# Patient Record
Sex: FEMALE | Race: BLACK OR AFRICAN AMERICAN | NOT HISPANIC OR LATINO
[De-identification: names, ages, dates, MRNs, and addresses within clinical notes are randomized per-mention and may not be internally consistent; named-entity substitution may affect disease eponyms.]

---

## 2017-02-01 ENCOUNTER — RESULT REVIEW (OUTPATIENT)
Age: 52
End: 2017-02-01

## 2017-04-24 ENCOUNTER — APPOINTMENT (OUTPATIENT)
Dept: MAMMOGRAPHY | Facility: IMAGING CENTER | Age: 52
End: 2017-04-24

## 2017-04-24 ENCOUNTER — OUTPATIENT (OUTPATIENT)
Dept: OUTPATIENT SERVICES | Facility: HOSPITAL | Age: 52
LOS: 1 days | End: 2017-04-24
Payer: COMMERCIAL

## 2017-04-24 DIAGNOSIS — Z98.89 OTHER SPECIFIED POSTPROCEDURAL STATES: Chronic | ICD-10-CM

## 2017-04-24 DIAGNOSIS — Z00.8 ENCOUNTER FOR OTHER GENERAL EXAMINATION: ICD-10-CM

## 2017-04-24 PROCEDURE — 77063 BREAST TOMOSYNTHESIS BI: CPT

## 2017-04-24 PROCEDURE — 77067 SCR MAMMO BI INCL CAD: CPT

## 2017-06-27 ENCOUNTER — EMERGENCY (EMERGENCY)
Facility: HOSPITAL | Age: 52
LOS: 1 days | Discharge: HOME | End: 2017-06-27
Admitting: EMERGENCY MEDICINE
Payer: COMMERCIAL

## 2017-06-27 VITALS
OXYGEN SATURATION: 100 % | SYSTOLIC BLOOD PRESSURE: 131 MMHG | TEMPERATURE: 99 F | HEART RATE: 78 BPM | DIASTOLIC BLOOD PRESSURE: 84 MMHG | RESPIRATION RATE: 19 BRPM

## 2017-06-27 DIAGNOSIS — Z98.89 OTHER SPECIFIED POSTPROCEDURAL STATES: Chronic | ICD-10-CM

## 2017-06-27 PROCEDURE — 99283 EMERGENCY DEPT VISIT LOW MDM: CPT | Mod: 25

## 2017-06-27 PROCEDURE — 99283 EMERGENCY DEPT VISIT LOW MDM: CPT

## 2017-06-27 PROCEDURE — 73630 X-RAY EXAM OF FOOT: CPT | Mod: 26,RT

## 2017-06-27 PROCEDURE — 73630 X-RAY EXAM OF FOOT: CPT

## 2017-06-27 RX ORDER — ACETAMINOPHEN 500 MG
650 TABLET ORAL ONCE
Qty: 0 | Refills: 0 | Status: COMPLETED | OUTPATIENT
Start: 2017-06-27 | End: 2017-06-27

## 2017-06-27 RX ADMIN — Medication 650 MILLIGRAM(S): at 16:03

## 2017-06-27 NOTE — ED PROVIDER NOTE - MUSCULOSKELETAL, MLM
range of motion is not limited, point ttp over dorsal aspect of midfoot of RLE.  No ttp at medial/lateral malleolus, no navicular ttp/ttp at base of 5th metatarsal.  No pain with inversion/eversion, dorsi/plantar flexion.  No pain with axial loading of b/l LE.  Able to bear some weight on RLE.  Bears majority of weight with LLE during stance phase of gait

## 2017-06-27 NOTE — ED PROVIDER NOTE - OBJECTIVE STATEMENT
52F presents with right foot pain s/p injury at work.  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  Was able to continue to work but has worse pain with weight bearing and ambulating.  No fall, no head trauma.  Denies swelling.  No analgesia on board. 52F presents with right foot pain s/p injury at work.  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  Was able to continue to work but has worse pain with weight bearing and ambulating.  No fall, no head trauma.  Denies swelling.  No analgesia on board.  No antiplatelets/anticoagulants

## 2017-06-27 NOTE — ED PROVIDER NOTE - PLAN OF CARE
You have an injury that requires local therapy. Please rest the injury, ice it (with separation from your skin with a bag or cloth) for 20 minute periods at least 4 times a day, keep pressure on the wound or wear a compressive dressing, and elevate the injury above the level of your heart at least 4 times a day, while icing, and while sleeping; this is known as RICE therapy.  Use Tylenol 650mg and/or Motrin 600mg every 6 hours as needed for pain.  Follow-up with your primary doctor.  Return for any new/worsening symptoms or concerns.

## 2017-06-27 NOTE — ED PROCEDURE NOTE - ATTENDING CONTRIBUTION TO CARE
I performed a history and physical exam of the patient and discussed their management with the resident. I reviewed the resident's note and agree with the documented findings and plan of care.

## 2017-06-27 NOTE — ED ADULT NURSE NOTE - OBJECTIVE STATEMENT
No 53 y/o F, reported to ED from work. A&ox3, c/o foot pain. 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 Pt reports 7/10 pain. Pt had PMS in BL feet. Pt denies taking medications for her pain prior to arrival. Pt denies falling or LOC after the incident. Will continue to monitor pt.

## 2017-06-27 NOTE — ED PROVIDER NOTE - CARE PLAN
Principal Discharge DX:	Foot pain, right  Instructions for follow-up, activity and diet:	You have an injury that requires local therapy. Please rest the injury, ice it (with separation from your skin with a bag or cloth) for 20 minute periods at least 4 times a day, keep pressure on the wound or wear a compressive dressing, and elevate the injury above the level of your heart at least 4 times a day, while icing, and while sleeping; this is known as RICE therapy.  Use Tylenol 650mg and/or Motrin 600mg every 6 hours as needed for pain.  Follow-up with your primary doctor.  Return for any new/worsening symptoms or concerns.

## 2017-06-27 NOTE — ED PROVIDER NOTE - PROGRESS NOTE DETAILS
ED reading of xray NAD.  No be discharged with ace wrap.  RICE, analgesia and return precautions.     Zoie Crawford MD

## 2017-06-27 NOTE — ED PROVIDER NOTE - ATTENDING CONTRIBUTION TO CARE
I performed a history and physical exam of the patient and discussed their management with the resident. I reviewed the resident's note and agree with the documented findings and plan of care. My medical decision making and observations are found above.  No bleedig, +pain with motion.

## 2017-06-27 NOTE — ED PROCEDURE NOTE - NS ED PERI VASCULAR NEG
fingers/toes warm to touch/no swelling/no paresthesia/no cyanosis of extremity/capillary refill time < 2 seconds

## 2018-02-25 ENCOUNTER — RESULT REVIEW (OUTPATIENT)
Age: 53
End: 2018-02-25

## 2018-06-12 ENCOUNTER — OUTPATIENT (OUTPATIENT)
Dept: OUTPATIENT SERVICES | Facility: HOSPITAL | Age: 53
LOS: 1 days | End: 2018-06-12
Payer: COMMERCIAL

## 2018-06-12 ENCOUNTER — APPOINTMENT (OUTPATIENT)
Dept: MAMMOGRAPHY | Facility: IMAGING CENTER | Age: 53
End: 2018-06-12
Payer: COMMERCIAL

## 2018-06-12 DIAGNOSIS — Z98.89 OTHER SPECIFIED POSTPROCEDURAL STATES: Chronic | ICD-10-CM

## 2018-06-12 DIAGNOSIS — Z00.00 ENCOUNTER FOR GENERAL ADULT MEDICAL EXAMINATION WITHOUT ABNORMAL FINDINGS: ICD-10-CM

## 2018-06-12 PROCEDURE — 77067 SCR MAMMO BI INCL CAD: CPT | Mod: 26

## 2018-06-12 PROCEDURE — 77067 SCR MAMMO BI INCL CAD: CPT

## 2018-06-12 PROCEDURE — 77063 BREAST TOMOSYNTHESIS BI: CPT

## 2018-06-12 PROCEDURE — 77063 BREAST TOMOSYNTHESIS BI: CPT | Mod: 26

## 2019-06-19 ENCOUNTER — RESULT REVIEW (OUTPATIENT)
Age: 54
End: 2019-06-19

## 2019-07-11 ENCOUNTER — APPOINTMENT (OUTPATIENT)
Dept: MAMMOGRAPHY | Facility: IMAGING CENTER | Age: 54
End: 2019-07-11
Payer: COMMERCIAL

## 2019-07-11 ENCOUNTER — OUTPATIENT (OUTPATIENT)
Dept: OUTPATIENT SERVICES | Facility: HOSPITAL | Age: 54
LOS: 1 days | End: 2019-07-11
Payer: COMMERCIAL

## 2019-07-11 DIAGNOSIS — Z00.8 ENCOUNTER FOR OTHER GENERAL EXAMINATION: ICD-10-CM

## 2019-07-11 DIAGNOSIS — Z98.89 OTHER SPECIFIED POSTPROCEDURAL STATES: Chronic | ICD-10-CM

## 2019-07-11 PROCEDURE — 77067 SCR MAMMO BI INCL CAD: CPT | Mod: 26

## 2019-07-11 PROCEDURE — 77063 BREAST TOMOSYNTHESIS BI: CPT

## 2019-07-11 PROCEDURE — 77063 BREAST TOMOSYNTHESIS BI: CPT | Mod: 26

## 2019-07-11 PROCEDURE — 77067 SCR MAMMO BI INCL CAD: CPT

## 2020-03-20 ENCOUNTER — EMERGENCY (EMERGENCY)
Facility: HOSPITAL | Age: 55
LOS: 1 days | Discharge: HOME | End: 2020-03-20
Attending: EMERGENCY MEDICINE
Payer: COMMERCIAL

## 2020-03-20 VITALS
DIASTOLIC BLOOD PRESSURE: 75 MMHG | RESPIRATION RATE: 18 BRPM | OXYGEN SATURATION: 100 % | HEART RATE: 78 BPM | SYSTOLIC BLOOD PRESSURE: 125 MMHG | WEIGHT: 199.08 LBS | HEIGHT: 63 IN | TEMPERATURE: 100 F

## 2020-03-20 DIAGNOSIS — Z98.89 OTHER SPECIFIED POSTPROCEDURAL STATES: Chronic | ICD-10-CM

## 2020-03-20 PROCEDURE — 99284 EMERGENCY DEPT VISIT MOD MDM: CPT

## 2020-03-20 PROCEDURE — 99282 EMERGENCY DEPT VISIT SF MDM: CPT

## 2020-03-20 NOTE — ED PROVIDER NOTE - ATTENDING CONTRIBUTION TO CARE
54 y/o f with pmhx  presents for evaluation of fever. 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 Patient states her symptoms began yesterday when she had a cough and fever today. .8F fever measured this morning.  Does not have recent travel and no known COVID + exposures. Didn't take tylenol or advil prior to arrival today.  limited exam as patient was upset / angry and wanted to leave.

## 2020-03-20 NOTE — ED PROVIDER NOTE - DATE/TIME 2
20-Mar-2020 09:02 Simponi Pregnancy And Lactation Text: The risk during pregnancy and breastfeeding is uncertain with this medication.

## 2020-03-20 NOTE — ED PROVIDER NOTE - PROGRESS NOTE DETAILS
Merrill, PGY1: pt is [employer] Employee. Appropriate PPE was used, in room from 8:10-8:20am Merrill, PGY1: pt eloped from ED, called back to test her for Covid as she is an employee but pt didn't  her phone

## 2020-03-20 NOTE — ED ADULT NURSE REASSESSMENT NOTE - NS ED NURSE REASSESS COMMENT FT1
Pt seen asssessed and elevated by ED MD. As per MD to be discharged home.  Pt left at 0900 stating she had to go to the bathroom prior to being given discharge paperwork; patient did not return; md and team aware

## 2020-03-20 NOTE — ED PROVIDER NOTE - CLINICAL SUMMARY MEDICAL DECISION MAKING FREE TEXT BOX
55F no PMH, presenting from OR staff upstairs with measured temp 100.8F this morning, dry cough and headache since yesterday. Denies sob or chest pain. Vitals reassuring, pt 100.2 temp. Clear lungs on exam, sats 100% RA, no resp distress. Possible Covid vs other viral syndrome. RVP and send home. 55F no PMH, presenting from OR staff upstairs with measured temp 100.8F this morning, dry cough and headache since yesterday. Denies sob or chest pain. Vitals reassuring, pt 100.2 temp. Clear lungs on exam, sats 100% RA, no resp distress. Possible Covid vs other viral syndrome. RVP and send home.  ATTG: : fever with recent cough, concern for infection. explained to patient that she may have COVID-19 vs other viral infection. given her s/s / vitals does not need admission at this time. provided her with return precautions and patient is aware of outpt testing sites.

## 2020-03-20 NOTE — ED PROVIDER NOTE - NSFOLLOWUPINSTRUCTIONS_ED_ALL_ED_FT
YOU WERE SEEN IN THE ED FOR A LIKELY VIRAL ILLNESS. WE CANNOT PERFORM DEFINITIVE TESTING AT THIS TIME FOR THE NOVEL CORONAVIRUS. PLEASE READ THE INFORMATION PACKET PROVIDED TO YOU CAREFULLY.     YOU SHOULD SELF-QUARANTINE FOR 14 DAYS TO AVOID POTENTIAL SPREAD OF THE CORONAVIRUS.     YOU WERE TESTED FOR MORE COMMON VIRUSES (THE FLU AND COMMON COLD VIRUSES). YOU WILL BE CONTACTED IF YOU TEST POSITIVE FOR ANY OTHER VIRUSES.    YOU WILL BE CONTACTED BY [facility] OR YOUR LOCAL HEALTH DEPARTMENT AFTER YOUR DISCHARGE TO HELP GUIDE YOU IN YOUR QUARANTINE AND ADVISE YOU FURTHER.    Return to the ED for difficulty breathing.    You may over the counter acetaminophen (Tylenol) 650mg every 6 hours as needed for fever or pain. There is some concern in the medical community about using ibuprofen (Advil, Motrin) and other NSAIDs in people with COVID infections and until there is more research on this subject it may be best to avoid NSAIDs like ibuprofen at the moment unless you have an allergy to acetaminophen (Tylenol).  Do NOT exceed 3500mg acetaminophen in 24 hours.  Please do not take these medications if you do not have pain or fever or if you have any history of liver disease.    -------------    What is a coronavirus?  Coronaviruses are a large family of viruses that cause illnesses ranging from the common cold to more severe diseases such as Middle East Respiratory Syndrome (MERS) and Severe Acute Respiratory Syndrome (SARS).    What is Novel Coronavirus (COVID-19)?  The Centers for Disease Control and Prevention (CDC) is closely monitoring the outbreak caused by COVID-19. For the latest information about COVID-19, visit the CDC website at CDC.gov/Coronavirus    How are coronaviruses spread?  Coronaviruses can be transmitted from person to person, usually after close contact with an infected  person (for example, in a household, workplace, or healthcare setting), via droplets that become airborne after a cough or sneeze. These droplets can then infect a nearby person. Transmission can also occur by touching recently contaminated surfaces.    Is there a treatment for a COVID-19?  There is no specific treatment for disease caused by COVID-19. However, many of the symptoms can be treated based on the patient’s clinical condition. Supportive care for infected persons can be highly effective.    What are the symptoms of coronavirus infection?  It depends on the virus, but common signs include fever and/or respiratory symptoms such as cough and shortness of breath. In more severe cases, infection can cause pneumonia, severe acute respiratory syndrome, kidney failure and even death. Fortunately, most cases of COVID-19 have an illness no different than the influenza (flu), with a majority of these patients having mild symptoms and overall mortality which appears to be not much different than the flu.    What can I do to protect myself?  The best precautionary measures:  – washing your hands  – covering your cough  – disinfecting surfaces  – it is also advisable to avoid close contact with anyone showing symptoms of respiratory illness such as coughing and sneezing  – those with symptoms should wear a surgical mask when around others    What can I do to protect those around me?  If you have been identified as someone who may be infected with COVID-19, we recommend you follow the self-isolation procedures outlined on the following page to protect those around you and to limit the spread of this virus.    We recommend the below precautionary steps from now until 14 days from when you returned from your travel or date of your last known possible contact:    — Do not go to work, school or public areas. Avoid using public transportation, ridesharing or taxis.  — As much as possible, separate yourself from other people in your home. If you can, you should stay in a room and away from other people. Also, you should use a separate bathroom if available.  — Wear the supplied mask whenever you are around other people.  — If you have a non-urgent medical appointment, please reschedule for a later date. If the appointment is urgent, please call the health care provider and tell them that you are on self-isolation for possible COVID-19. This will help the health care provider’s office take steps to keep other people from getting infected or exposed. If you can reschedule routine appointments, do so.  — Wash your hands often with soap and water for at least 15 to 20 seconds or clean your hands with an alcohol-based hand  that contains 60 to 95% alcohol, covering all surfaces of your hands and rubbing them together until they feel dry. Soap and water should be used preferentially if hands are visibly dirty.  — Cover your mouth and nose with a tissue when you cough or sneeze. Throw used tissues in a lined trash can. Immediately wash your hands.  — Avoid touching your eyes, nose, and mouth with your hands.  — Avoid sharing personal household items. You should not share dishes, drinking glasses, cups, eating utensils, towels, or bedding with other people or pets in your home. After using these items, they should be washed thoroughly with soap and water.  — Clean and disinfect all “high-touch” surfaces every day. High touch surfaces include counters, tabletops, doorknobs, light switches, remote controls, bathroom fixtures, toilets, phones, keyboards, tablets, and bedside tables. Also, clean any surfaces that may have blood, stool, or body fluids on them.

## 2020-03-20 NOTE — ED PROVIDER NOTE - OBJECTIVE STATEMENT
55F no PMH p/w dry cough and headache that began yesterday and 100.8F fever measured this morning. 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 Denies any chest pain, SOB, abd pain, n/v/d, urinary sxs. Didn't take tylenol or advil prior to arrival today.

## 2020-03-20 NOTE — ED PROVIDER NOTE - PHYSICAL EXAMINATION
GENERAL: no acute distress, non-toxic appearing  HEENT: normal conjunctiva, oral mucosa moist  CARDIAC: regular rate and rhythm, normal S1 and S2, no appreciable murmurs  PULM: clear to ascultation bilaterally, no crackles, rales, rhonchi, or wheezing, saturating 100% on RA, speaking in full sentences   GI: abdomen nondistended, soft, nontender  NEURO: alert and oriented x 3, normal speech, interactive, no focal motor or sensory deficits  MSK: no visible deformities, no peripheral edema, calf tenderness/redness/swelling  PSYCH: appropriate mood and affect

## 2020-04-25 ENCOUNTER — MESSAGE (OUTPATIENT)
Age: 55
End: 2020-04-25

## 2020-05-01 ENCOUNTER — APPOINTMENT (OUTPATIENT)
Dept: DISASTER EMERGENCY | Facility: HOSPITAL | Age: 55
End: 2020-05-01

## 2020-05-02 LAB
SARS-COV-2 IGG SERPL IA-ACNC: 7.7 INDEX
SARS-COV-2 IGG SERPL QL IA: POSITIVE

## 2021-07-14 ENCOUNTER — OUTPATIENT (OUTPATIENT)
Dept: OUTPATIENT SERVICES | Facility: HOSPITAL | Age: 56
LOS: 1 days | End: 2021-07-14
Payer: COMMERCIAL

## 2021-07-14 ENCOUNTER — APPOINTMENT (OUTPATIENT)
Dept: MAMMOGRAPHY | Facility: IMAGING CENTER | Age: 56
End: 2021-07-14
Payer: COMMERCIAL

## 2021-07-14 DIAGNOSIS — Z00.8 ENCOUNTER FOR OTHER GENERAL EXAMINATION: ICD-10-CM

## 2021-07-14 DIAGNOSIS — Z98.89 OTHER SPECIFIED POSTPROCEDURAL STATES: Chronic | ICD-10-CM

## 2021-07-14 PROCEDURE — 77063 BREAST TOMOSYNTHESIS BI: CPT | Mod: 26

## 2021-07-14 PROCEDURE — 77067 SCR MAMMO BI INCL CAD: CPT

## 2021-07-14 PROCEDURE — 77063 BREAST TOMOSYNTHESIS BI: CPT

## 2021-07-14 PROCEDURE — 77067 SCR MAMMO BI INCL CAD: CPT | Mod: 26

## 2022-07-22 ENCOUNTER — RESULT REVIEW (OUTPATIENT)
Age: 57
End: 2022-07-22

## 2022-08-02 ENCOUNTER — RESULT REVIEW (OUTPATIENT)
Age: 57
End: 2022-08-02

## 2022-08-02 ENCOUNTER — APPOINTMENT (OUTPATIENT)
Dept: MAMMOGRAPHY | Facility: IMAGING CENTER | Age: 57
End: 2022-08-02

## 2022-08-02 ENCOUNTER — OUTPATIENT (OUTPATIENT)
Dept: OUTPATIENT SERVICES | Facility: HOSPITAL | Age: 57
LOS: 1 days | End: 2022-08-02
Payer: COMMERCIAL

## 2022-08-02 DIAGNOSIS — Z98.89 OTHER SPECIFIED POSTPROCEDURAL STATES: Chronic | ICD-10-CM

## 2022-08-02 DIAGNOSIS — Z00.8 ENCOUNTER FOR OTHER GENERAL EXAMINATION: ICD-10-CM

## 2022-08-02 PROCEDURE — 77063 BREAST TOMOSYNTHESIS BI: CPT | Mod: 26

## 2022-08-02 PROCEDURE — 77067 SCR MAMMO BI INCL CAD: CPT

## 2022-08-02 PROCEDURE — 77063 BREAST TOMOSYNTHESIS BI: CPT

## 2022-08-02 PROCEDURE — 77067 SCR MAMMO BI INCL CAD: CPT | Mod: 26

## 2023-10-12 NOTE — ED ADULT TRIAGE NOTE - WEIGHT IN LBS
199 Complex Repair And M Plasty Text: The defect edges were debeveled with a #15 scalpel blade.  The primary defect was closed partially with a complex linear closure.  Given the location of the remaining defect, shape of the defect and the proximity to free margins an M plasty was deemed most appropriate for complete closure of the defect.  Using a sterile surgical marker, an appropriate advancement flap was drawn incorporating the defect and placing the expected incisions within the relaxed skin tension lines where possible.    The area thus outlined was incised deep to adipose tissue with a #15 scalpel blade.  The skin margins were undermined to an appropriate distance in all directions utilizing iris scissors.

## 2023-11-16 ENCOUNTER — OUTPATIENT (OUTPATIENT)
Dept: OUTPATIENT SERVICES | Facility: HOSPITAL | Age: 58
LOS: 1 days | End: 2023-11-16
Payer: COMMERCIAL

## 2023-11-16 ENCOUNTER — APPOINTMENT (OUTPATIENT)
Dept: MAMMOGRAPHY | Facility: IMAGING CENTER | Age: 58
End: 2023-11-16
Payer: COMMERCIAL

## 2023-11-16 DIAGNOSIS — Z00.8 ENCOUNTER FOR OTHER GENERAL EXAMINATION: ICD-10-CM

## 2023-11-16 DIAGNOSIS — Z98.89 OTHER SPECIFIED POSTPROCEDURAL STATES: Chronic | ICD-10-CM

## 2023-11-16 PROCEDURE — 77063 BREAST TOMOSYNTHESIS BI: CPT

## 2023-11-16 PROCEDURE — 77067 SCR MAMMO BI INCL CAD: CPT

## 2023-11-16 PROCEDURE — 77063 BREAST TOMOSYNTHESIS BI: CPT | Mod: 26

## 2023-11-16 PROCEDURE — 77067 SCR MAMMO BI INCL CAD: CPT | Mod: 26

## 2024-02-08 ENCOUNTER — EMERGENCY (EMERGENCY)
Facility: HOSPITAL | Age: 59
LOS: 1 days | Discharge: HOME | End: 2024-02-08
Attending: EMERGENCY MEDICINE
Payer: COMMERCIAL

## 2024-02-08 VITALS
DIASTOLIC BLOOD PRESSURE: 76 MMHG | HEART RATE: 68 BPM | OXYGEN SATURATION: 96 % | SYSTOLIC BLOOD PRESSURE: 123 MMHG | RESPIRATION RATE: 16 BRPM

## 2024-02-08 VITALS
TEMPERATURE: 99 F | HEIGHT: 63 IN | RESPIRATION RATE: 17 BRPM | SYSTOLIC BLOOD PRESSURE: 128 MMHG | DIASTOLIC BLOOD PRESSURE: 83 MMHG | HEART RATE: 61 BPM | OXYGEN SATURATION: 97 % | WEIGHT: 192.02 LBS

## 2024-02-08 DIAGNOSIS — Z98.891 HISTORY OF UTERINE SCAR FROM PREVIOUS SURGERY: Chronic | ICD-10-CM

## 2024-02-08 DIAGNOSIS — Z98.89 OTHER SPECIFIED POSTPROCEDURAL STATES: Chronic | ICD-10-CM

## 2024-02-08 PROCEDURE — 99284 EMERGENCY DEPT VISIT MOD MDM: CPT

## 2024-02-08 PROCEDURE — 99053 MED SERV 10PM-8AM 24 HR FAC: CPT

## 2024-02-08 PROCEDURE — 99283 EMERGENCY DEPT VISIT LOW MDM: CPT | Mod: 25

## 2024-02-08 PROCEDURE — 73620 X-RAY EXAM OF FOOT: CPT

## 2024-02-08 PROCEDURE — 73620 X-RAY EXAM OF FOOT: CPT | Mod: 26,RT

## 2024-02-08 RX ORDER — ACETAMINOPHEN 500 MG
975 TABLET ORAL ONCE
Refills: 0 | Status: COMPLETED | OUTPATIENT
Start: 2024-02-08 | End: 2024-02-08

## 2024-02-08 RX ORDER — IBUPROFEN 200 MG
400 TABLET ORAL ONCE
Refills: 0 | Status: COMPLETED | OUTPATIENT
Start: 2024-02-08 | End: 2024-02-08

## 2024-02-08 RX ADMIN — Medication 400 MILLIGRAM(S): at 03:20

## 2024-02-08 NOTE — ED PROVIDER NOTE - PROGRESS NOTE DETAILS
Attending Simon:  X-ray showed no foreign body, there was a calcaneal spur, I did discuss this with the patient.  Potentially this was present pain.  She states she performed the podiatrist and currently feeling better wants to go back to work.

## 2024-02-08 NOTE — ED ADULT NURSE NOTE - OBJECTIVE STATEMENT
58y F A&Ox4 c/o right heel pain. Pt states that she may have stepped on something 2 weeks ago and has been having right heel pain since. Pt states the pain  has not gone away and does not radiate however there is some discoloration to the site. Pt also states that when at rest there is no pain but when walking the pain becomes intense. Upon assessment pt pulse motor and sensory intact. Pt has dime sized discoloration to right heel that is tender to palpation. No drainage or blood noted.  Denies any Fever, N/V/D/CP/SOB/Gi/Gu symptoms. No PMH. PSH of 3x c-sections. VSS

## 2024-02-08 NOTE — ED PROVIDER NOTE - PHYSICAL EXAMINATION
Exam:   General: Non toxic, well appearing  HENT: No pharyngeal erythema/exudate, external ear exam normal  Eyes: PEERL, EOMI  Lungs: CTA B/L, no wheezing, no rales, no ronchi  Chest: Normal Heart sounds, no murmurs, no rubs no gallops  Abdomen: Soft, no tenderness, no rigidity, no guarding, neg Rovsing's signs, neg tenderness at Mckburney's point  MSK: FROM of joints, no tenderness to palpation, heel w/ no ttp, nv intact, small 1 cm blister w/ center break in skin   Skin: No new rashes or lesions  Neuro: Alert and oriented x 3, power 5/5 x 4, CN II-XII GI, no facial asymmetry, no cerebellar findings

## 2024-02-08 NOTE — ED PROVIDER NOTE - OBJECTIVE STATEMENT
57 yo f three weeks of heel pain, mild, saw podiatry for this, pain is non radiating, worse w/ ambulation, r sided, has not had before, no rememberence of trauma or fb.

## 2024-02-08 NOTE — ED PROVIDER NOTE - NSFOLLOWUPINSTRUCTIONS_ED_ALL_ED_FT
1. TAKE ALL MEDICATIONS AS DIRECTED.  FOR PAIN YOU CAN TAKE IBUPROFEN (MOTRIN, ADVIL) OR ACETAMINOPHEN (TYLENOL) AS NEEDED, AS DIRECTED ON PACKAGING.  2. FOLLOW UP WITH YOUR PODIATRIST AS DIRECTED.  YOU WERE GIVEN COPIES OF ALL LABS AND IMAGING RESULTS FROM YOUR ER VISIT--PLEASE TAKE THEM WITH YOU TO YOUR APPOINTMENT.  3. IF NEEDED, CALL [phone] TO FIND A PRIMARY CARE PHYSICIAN.  OR CALL [phone] TO MAKE AN APPOINTMENT WITH THE MEDICAL CLINIC.  4. RETURN TO THE ER FOR ANY WORSENING SYMPTOMS.

## 2024-02-08 NOTE — ED PROVIDER NOTE - CLINICAL SUMMARY MEDICAL DECISION MAKING FREE TEXT BOX
Attending Simon:  Patient presents the emergency department with chief complaint of 3 weeks of heel pain.  She states it has gotten persistently worse.  She has been wearing new shoes since shortly before this.  I will.  She has seen her podiatrist for this.  The had taken an x-ray at that point in time.  She reports that she has a lesion on the bottom of her foot which is a "scraped" and put a Band-Aid on.  She states that she has soaked which she has not taken medication for it today.  She is not having any fevers chills nausea vomiting diarrhea.  There has not been any changes in the lesion.  She has some tingling in her heel.  The pain is currently mild.  It is worse when she walks on it.  She is afebrile, vital stable she is nontoxic and well-appearing, looking at her heel she does have a small 1 cm diameter area of whiteness that seems consistent with a blister does have a small hardcore in the center.  It does not seem like a foreign body.  Potential ulceration does not seem infected.  Does not have significant tenderness over the heel.  Plan to get x-ray of this area to ensure no foreign body give pain meds.  Discussed would recommend continuing follow-up with podiatry.

## 2024-02-18 ENCOUNTER — EMERGENCY (EMERGENCY)
Facility: HOSPITAL | Age: 59
LOS: 0 days | Discharge: HOME | End: 2024-02-18
Attending: STUDENT IN AN ORGANIZED HEALTH CARE EDUCATION/TRAINING PROGRAM
Payer: COMMERCIAL

## 2024-02-18 VITALS
OXYGEN SATURATION: 98 % | RESPIRATION RATE: 19 BRPM | HEIGHT: 63 IN | WEIGHT: 192.02 LBS | DIASTOLIC BLOOD PRESSURE: 63 MMHG | HEART RATE: 69 BPM | TEMPERATURE: 98 F | SYSTOLIC BLOOD PRESSURE: 115 MMHG

## 2024-02-18 VITALS
SYSTOLIC BLOOD PRESSURE: 109 MMHG | RESPIRATION RATE: 18 BRPM | DIASTOLIC BLOOD PRESSURE: 67 MMHG | HEART RATE: 63 BPM | TEMPERATURE: 98 F | OXYGEN SATURATION: 96 %

## 2024-02-18 DIAGNOSIS — R09.81 NASAL CONGESTION: ICD-10-CM

## 2024-02-18 DIAGNOSIS — Z98.89 OTHER SPECIFIED POSTPROCEDURAL STATES: Chronic | ICD-10-CM

## 2024-02-18 DIAGNOSIS — M79.10 MYALGIA, UNSPECIFIED SITE: ICD-10-CM

## 2024-02-18 DIAGNOSIS — Z98.891 HISTORY OF UTERINE SCAR FROM PREVIOUS SURGERY: Chronic | ICD-10-CM

## 2024-02-18 DIAGNOSIS — R05.9 COUGH, UNSPECIFIED: ICD-10-CM

## 2024-02-18 DIAGNOSIS — R00.1 BRADYCARDIA, UNSPECIFIED: ICD-10-CM

## 2024-02-18 DIAGNOSIS — U07.1 COVID-19: ICD-10-CM

## 2024-02-18 LAB
ALBUMIN SERPL ELPH-MCNC: 3.3 G/DL — SIGNIFICANT CHANGE UP (ref 3.3–5)
ALP SERPL-CCNC: 86 U/L — SIGNIFICANT CHANGE UP (ref 40–120)
ALT FLD-CCNC: 19 U/L — SIGNIFICANT CHANGE UP (ref 12–78)
ANION GAP SERPL CALC-SCNC: 3 MMOL/L — LOW (ref 5–17)
AST SERPL-CCNC: 11 U/L — LOW (ref 15–37)
BASOPHILS # BLD AUTO: 0.02 K/UL — SIGNIFICANT CHANGE UP (ref 0–0.2)
BASOPHILS NFR BLD AUTO: 0.3 % — SIGNIFICANT CHANGE UP (ref 0–2)
BILIRUB SERPL-MCNC: 0.2 MG/DL — SIGNIFICANT CHANGE UP (ref 0.2–1.2)
BUN SERPL-MCNC: 13 MG/DL — SIGNIFICANT CHANGE UP (ref 7–23)
CALCIUM SERPL-MCNC: 9.7 MG/DL — SIGNIFICANT CHANGE UP (ref 8.5–10.1)
CHLORIDE SERPL-SCNC: 112 MMOL/L — HIGH (ref 96–108)
CO2 SERPL-SCNC: 27 MMOL/L — SIGNIFICANT CHANGE UP (ref 22–31)
CREAT SERPL-MCNC: 0.75 MG/DL — SIGNIFICANT CHANGE UP (ref 0.5–1.3)
DACRYOCYTES BLD QL SMEAR: SLIGHT — SIGNIFICANT CHANGE UP
EGFR: 92 ML/MIN/1.73M2 — SIGNIFICANT CHANGE UP
EOSINOPHIL # BLD AUTO: 0.09 K/UL — SIGNIFICANT CHANGE UP (ref 0–0.5)
EOSINOPHIL NFR BLD AUTO: 1.5 % — SIGNIFICANT CHANGE UP (ref 0–6)
GLUCOSE SERPL-MCNC: 104 MG/DL — HIGH (ref 70–99)
HCT VFR BLD CALC: 38.4 % — SIGNIFICANT CHANGE UP (ref 34.5–45)
HGB BLD-MCNC: 12.4 G/DL — SIGNIFICANT CHANGE UP (ref 11.5–15.5)
IMM GRANULOCYTES NFR BLD AUTO: 0.2 % — SIGNIFICANT CHANGE UP (ref 0–0.9)
LYMPHOCYTES # BLD AUTO: 1.15 K/UL — SIGNIFICANT CHANGE UP (ref 1–3.3)
LYMPHOCYTES # BLD AUTO: 19.8 % — SIGNIFICANT CHANGE UP (ref 13–44)
MANUAL SMEAR VERIFICATION: SIGNIFICANT CHANGE UP
MCHC RBC-ENTMCNC: 21.2 PG — LOW (ref 27–34)
MCHC RBC-ENTMCNC: 32.3 G/DL — SIGNIFICANT CHANGE UP (ref 32–36)
MCV RBC AUTO: 65.6 FL — LOW (ref 80–100)
MONOCYTES # BLD AUTO: 0.7 K/UL — SIGNIFICANT CHANGE UP (ref 0–0.9)
MONOCYTES NFR BLD AUTO: 12 % — SIGNIFICANT CHANGE UP (ref 2–14)
NEUTROPHILS # BLD AUTO: 3.85 K/UL — SIGNIFICANT CHANGE UP (ref 1.8–7.4)
NEUTROPHILS NFR BLD AUTO: 66.2 % — SIGNIFICANT CHANGE UP (ref 43–77)
NRBC # BLD: 0 /100 WBCS — SIGNIFICANT CHANGE UP (ref 0–0)
PLAT MORPH BLD: NORMAL — SIGNIFICANT CHANGE UP
PLATELET # BLD AUTO: 188 K/UL — SIGNIFICANT CHANGE UP (ref 150–400)
POTASSIUM SERPL-MCNC: 4.2 MMOL/L — SIGNIFICANT CHANGE UP (ref 3.5–5.3)
POTASSIUM SERPL-SCNC: 4.2 MMOL/L — SIGNIFICANT CHANGE UP (ref 3.5–5.3)
PROT SERPL-MCNC: 7.5 GM/DL — SIGNIFICANT CHANGE UP (ref 6–8.3)
RAPID RVP RESULT: DETECTED
RBC # BLD: 5.85 M/UL — HIGH (ref 3.8–5.2)
RBC # FLD: 16.3 % — HIGH (ref 10.3–14.5)
RBC BLD AUTO: NORMAL — SIGNIFICANT CHANGE UP
SARS-COV-2 RNA SPEC QL NAA+PROBE: DETECTED
SCHISTOCYTES BLD QL AUTO: SLIGHT — SIGNIFICANT CHANGE UP
SODIUM SERPL-SCNC: 142 MMOL/L — SIGNIFICANT CHANGE UP (ref 135–145)
TROPONIN I, HIGH SENSITIVITY RESULT: 3.2 NG/L — SIGNIFICANT CHANGE UP
WBC # BLD: 5.82 K/UL — SIGNIFICANT CHANGE UP (ref 3.8–10.5)
WBC # FLD AUTO: 5.82 K/UL — SIGNIFICANT CHANGE UP (ref 3.8–10.5)

## 2024-02-18 PROCEDURE — 99285 EMERGENCY DEPT VISIT HI MDM: CPT

## 2024-02-18 PROCEDURE — 71046 X-RAY EXAM CHEST 2 VIEWS: CPT | Mod: 26

## 2024-02-18 PROCEDURE — 93010 ELECTROCARDIOGRAM REPORT: CPT

## 2024-02-18 RX ORDER — SODIUM CHLORIDE 9 MG/ML
1000 INJECTION INTRAMUSCULAR; INTRAVENOUS; SUBCUTANEOUS ONCE
Refills: 0 | Status: COMPLETED | OUTPATIENT
Start: 2024-02-18 | End: 2024-02-18

## 2024-02-18 RX ORDER — ACETAMINOPHEN 500 MG
1000 TABLET ORAL ONCE
Refills: 0 | Status: COMPLETED | OUTPATIENT
Start: 2024-02-18 | End: 2024-02-18

## 2024-02-18 RX ORDER — IBUPROFEN 200 MG
600 TABLET ORAL ONCE
Refills: 0 | Status: COMPLETED | OUTPATIENT
Start: 2024-02-18 | End: 2024-02-18

## 2024-02-18 RX ORDER — FLUTICASONE PROPIONATE 50 MCG
1 SPRAY, SUSPENSION NASAL ONCE
Refills: 0 | Status: COMPLETED | OUTPATIENT
Start: 2024-02-18 | End: 2024-02-18

## 2024-02-18 RX ADMIN — SODIUM CHLORIDE 1000 MILLILITER(S): 9 INJECTION INTRAMUSCULAR; INTRAVENOUS; SUBCUTANEOUS at 18:59

## 2024-02-18 RX ADMIN — Medication 1000 MILLIGRAM(S): at 18:58

## 2024-02-18 RX ADMIN — Medication 400 MILLIGRAM(S): at 17:30

## 2024-02-18 RX ADMIN — Medication 1 SPRAY(S): at 18:14

## 2024-02-18 RX ADMIN — Medication 600 MILLIGRAM(S): at 18:58

## 2024-02-18 RX ADMIN — Medication 600 MILLIGRAM(S): at 17:26

## 2024-02-18 RX ADMIN — SODIUM CHLORIDE 1000 MILLILITER(S): 9 INJECTION INTRAMUSCULAR; INTRAVENOUS; SUBCUTANEOUS at 17:30

## 2024-02-18 RX ADMIN — Medication 100 MILLIGRAM(S): at 18:14

## 2024-02-18 NOTE — ED ADULT NURSE NOTE - OBJECTIVE STATEMENT
58y F A&Ox4. pt c/o chest pain and shortness of breath since this morning. pt also c/o cough, congestion  and body aches. no history.No PMH. PSH of 3x c-sections. VSS

## 2024-02-18 NOTE — ED PROVIDER NOTE - PATIENT PORTAL LINK FT
You can access the FollowMyHealth Patient Portal offered by [facility] by registering at the following website: [email or web address]. By joining [email or web address]’s FollowMyHealth portal, you will also be able to view your health information using other applications (apps) compatible with our system.

## 2024-02-18 NOTE — ED PROVIDER NOTE - PROGRESS NOTE DETAILS
IRENE Clemons: Workup reviewed. Results endorsed including unexpected incidental findings (copy of reports provided to patient). Shared Decision Making - Reassessment performed. Patient is medically stable for discharge. Strict return precautions given, discussed red flag signs/symptoms. Patient to follow up with PMD. Patient/parent displays understanding and agreeable with plan, comfortable with discharge plan home. Plan for discharge discussed with  who agrees with disposition and discharge plan.

## 2024-02-18 NOTE — ED ADULT NURSE NOTE - NSFALLUNIVINTERV_ED_ALL_ED
Bed/Stretcher in lowest position, wheels locked, appropriate side rails in place/Call bell, personal items and telephone in reach/Instruct patient to call for assistance before getting out of bed/chair/stretcher/Non-slip footwear applied when patient is off stretcher/[place] to call system/Physically safe environment - no spills, clutter or unnecessary equipment/Purposeful proactive rounding/Room/bathroom lighting operational, light cord in reach

## 2024-02-18 NOTE — ED PROVIDER NOTE - NSFOLLOWUPINSTRUCTIONS_ED_ALL_ED_FT
Follow-up with your Primary Care Physician within the next week.    Medications  - Take Tylenol (Acetaminophen) 650 mg every 4-6 hours AND/OR Motrin (Ibuprofen) 600 mg every 6-8 hours as needed for pain/fever.    Advance activity as tolerated.  Continue all previously prescribed medications as directed unless otherwise instructed.  Follow up with your primary care physician in 48-72 hours- bring copies of your results.  Return to the ER for worsening or persistent symptoms, and/or ANY NEW OR CONCERNING SYMPTOMS such as fever, chest pain, difficulty breathing, shortness of breath, abdominal pain, intractable nausea/vomiting, neck pain/stiffness, or headaches. If you have issues obtaining follow up, please call: [phone] to obtain a doctor or specialist who takes your insurance in your area.  You may call [phone] to make an appointment with the internal medicine clinic.    Things to Know about the COVID-19 Pandemic    Things to Know about the COVID-19 Pandemic    Important Ways to Slow the Spread  Wear a mask that covers your nose and mouth to help protect yourself and others.  Stay 6 feet apart from others who don't live with you.  Get a COVID-19 vaccine when it is available to you.  Avoid crowds and poorly ventilated indoor spaces.  Wash your hands often with soap and water. Use hand  if soap and water aren't available.    If you are at risk of getting very sick  People of any age, even healthy young adults and children, can get COVID-19.  People who are older or have certain underlying medical conditions are at higher risk of getting very sick from COVID-19.  Other groups may be at higher risk for getting COVID-19 or having more severe illness.    Getting a COVID-19 Vaccine  Authorized COVID-19 vaccines can help protect you from COVID-19.  You should get a COVID-19 vaccine when it is available to you.  Once you are fully vaccinated, you may be able to start doing some things that you had stopped doing because of the pandemic.    What to do if you're sick  Stay home except to get medical care. If you have symptoms of COVID-19, contact your healthcare provider and get tested.  Isolate yourself from others, including those living in your household, to prevent spread to them and the people that they may have contact with, like grandparents.  Call 888 if you are having emergency warning signs, like trouble breathing, pain or pressure in chest.    How to get a test for current infection  Visit your state, [facility], local, and [facility] health department'swebsite to look for the latest local information on testing.  Talk to your healthcare provider about getting tested. You and your healthcare provider might consider either in-person testing, an at-home collection kit, or an at-home test.  If you have symptoms of COVID-19, or if you have not been vaccinated and have been in close contact with someone with COVID-19, it is still important to stay home even if you are not tested.    What symptoms to watch for  The most common symptoms of COVID-19 are    Fever   Cough   Headaches   Fatigue   Muscle or body aches   Loss of taste or smell   Sore throat   Nausea   Diarrhea    Other symptoms are signs of serious illness. If someone has trouble breathing, chest pain or pressure, or difficulty staying awake, get medical care immediately.    I wear a mask because...  CDC staff give their reasons for wearing a mask.    Wear a mask because    Content source: National Center for Immunization and Respiratory Diseases (NCIRD), Division of Viral Diseases    03/17/2021    ADDITIONAL NOTES AND INSTRUCTIONS    Please follow up with your Primary MD in 24-48 hr.  Seek immediate medical care for any new/worsening signs or symptoms.

## 2024-02-18 NOTE — ED ADULT NURSE REASSESSMENT NOTE - NS ED NURSE REASSESS COMMENT FT1
Pt resting on stretcher. Safety maintained. Updates given. Monitoring continued. IV site & patency inspected and integrity maintained. Universal fall precautions in place, side rails x2 raised,  socks provided, personal belongings within reach, oriented to call bell system. Awaiting rads

## 2024-02-18 NOTE — ED ADULT TRIAGE NOTE - CHIEF COMPLAINT QUOTE
pt c/o chest pain and shortness of breath since this morning. pt also c/o cough, congestion  and body aches. no history.

## 2024-02-18 NOTE — ED PROVIDER NOTE - CLINICAL SUMMARY MEDICAL DECISION MAKING FREE TEXT BOX
57 y/o F with no significant PMH presenting to the ED c/o viral symptoms. She endorses cough, congestion, myalgias x 1 day. She endorses chest pain only with cough. No significant fever or chills. No N/V/D. No urinary complaints.  In the ED, vitals stable.  Patient is in NAD.  I suspect viral syndrome.  Will obtain labs, bolus fluids, send RVP  Will obtain CXR in setting of cough  Send trop/check EKG although low suspicion for ACS in setting of viral symptoms  Reassess following labs/imaging  Jerad Mahoney,